# Patient Record
Sex: FEMALE | Race: ASIAN | NOT HISPANIC OR LATINO | URBAN - METROPOLITAN AREA
[De-identification: names, ages, dates, MRNs, and addresses within clinical notes are randomized per-mention and may not be internally consistent; named-entity substitution may affect disease eponyms.]

---

## 2018-12-14 ENCOUNTER — HOSPITAL ENCOUNTER (OUTPATIENT)
Dept: CT IMAGING | Facility: HOSPITAL | Age: 58
Discharge: HOME | End: 2018-12-14
Admitting: PHYSICIAN ASSISTANT

## 2018-12-14 ENCOUNTER — TRANSCRIBE ORDERS (OUTPATIENT)
Dept: ADMINISTRATIVE | Facility: HOSPITAL | Age: 58
End: 2018-12-14

## 2018-12-14 DIAGNOSIS — R19.01 RIGHT UPPER QUADRANT ABDOMINAL MASS: ICD-10-CM

## 2018-12-14 DIAGNOSIS — R19.01 RIGHT UPPER QUADRANT ABDOMINAL MASS: Primary | ICD-10-CM

## 2018-12-14 PROCEDURE — 74150 CT ABDOMEN W/O CONTRAST: CPT

## 2018-12-14 RX ADMIN — BARIUM SULFATE 450 ML: 21 SUSPENSION ORAL at 16:00

## 2025-07-01 ENCOUNTER — TELEPHONE (OUTPATIENT)
Dept: INTERNAL MEDICINE | Facility: CLINIC | Age: 65
End: 2025-07-01
Payer: MEDICARE

## 2025-07-01 NOTE — TELEPHONE ENCOUNTER
Caller: Ruthann Payan    Relationship to patient: Self    Best call back number: [phone]     Chief complaint:     Type of visit: NEW PATIENT    Requested date:      If rescheduling, when is the original appointment:      Additional notes:  PATIENT IS CALLING TO STATE THAT THEY [facility] AS DR LUNSFORD AND WOULD LIKE TO SEE IF HE WOULD SEE HER AS A NEW PATIENT.  SHE WAS RECOMMENDED BY A FRIEND THAT GOES TO THAT [facility].

## 2025-07-02 ENCOUNTER — TELEPHONE (OUTPATIENT)
Dept: INTERNAL MEDICINE | Facility: CLINIC | Age: 65
End: 2025-07-02
Payer: MEDICARE

## 2025-07-03 ENCOUNTER — OFFICE VISIT (OUTPATIENT)
Dept: INTERNAL MEDICINE | Facility: CLINIC | Age: 65
End: 2025-07-03
Payer: MEDICARE

## 2025-07-03 VITALS
TEMPERATURE: 97.1 F | RESPIRATION RATE: 18 BRPM | WEIGHT: 115.8 LBS | DIASTOLIC BLOOD PRESSURE: 60 MMHG | OXYGEN SATURATION: 95 % | SYSTOLIC BLOOD PRESSURE: 92 MMHG | HEART RATE: 64 BPM | HEIGHT: 66 IN | BODY MASS INDEX: 18.61 KG/M2

## 2025-07-03 DIAGNOSIS — R79.9 ABNORMAL BLOOD CHEMISTRY: ICD-10-CM

## 2025-07-03 DIAGNOSIS — R73.9 HYPERGLYCEMIA: ICD-10-CM

## 2025-07-03 DIAGNOSIS — T45.1X5S ADVERSE EFFECT OF CHEMOTHERAPY, SEQUELA: Primary | ICD-10-CM

## 2025-07-03 DIAGNOSIS — R06.02 SHORTNESS OF BREATH: ICD-10-CM

## 2025-07-03 DIAGNOSIS — D64.9 ANEMIA, UNSPECIFIED TYPE: ICD-10-CM

## 2025-07-03 DIAGNOSIS — Z13.220 SCREENING CHOLESTEROL LEVEL: ICD-10-CM

## 2025-07-03 DIAGNOSIS — E55.9 VITAMIN D DEFICIENCY, UNSPECIFIED: ICD-10-CM

## 2025-07-03 RX ORDER — URSODIOL 250 MG/1
250 TABLET, FILM COATED ORAL 2 TIMES DAILY
Qty: 180 TABLET | Refills: 1 | Status: SHIPPED | OUTPATIENT
Start: 2025-07-03

## 2025-07-03 RX ORDER — URSODIOL 250 MG/1
250 TABLET, FILM COATED ORAL 2 TIMES DAILY
COMMUNITY
End: 2025-07-03 | Stop reason: SDUPTHER

## 2025-07-03 RX ORDER — LETROZOLE 2.5 MG/1
2.5 TABLET, FILM COATED ORAL DAILY
COMMUNITY

## 2025-07-03 NOTE — PROGRESS NOTES
Subjective   Ruthann Payan is a 64 y.o. female.     History of Present Illness  Presents to establish care  Breast cancer. Bilateral mastectomy. On letrozole.   States letrozole caused some issues with her liver, and her doctor in [facility] started her on actifall and it has helped her greatly. She takes takes 250 mg and states it works very well for her and would like refill  Shortness of breath. Had pft in [place] and was started on fluticasone-vilanterol. States she isn't really using it. She states she has been doing lung exercises and singing in choir at her [employer] to train her lungs and she feels that this is working for her. She did have lobectomy of right lung due to lung adenocarcinoma in the past.   She also has had rou-en-y due to gastric cancer.   States she gets her cancer care and scans in [facility] when she goes.   She noticed a drop in hemoglobin due to dizziness, and she followed with hematology at . Has not had her labs drawn from that visit yet, but will have them done today.       The following portions of the patient's history were reviewed and updated as appropriate: allergies, current medications, past family history, past medical history, past social history, past surgical history, and problem list.    Review of Systems   All other systems reviewed and are negative.      Objective   Physical Exam  Vitals and nursing note reviewed.   Constitutional:       Appearance: Normal appearance.   HENT:      Head: Normocephalic and atraumatic.      Right Ear: External ear normal.      Left Ear: External ear normal.      Nose: Nose normal.      Mouth/Throat:      Mouth: Mucous membranes are moist.      Pharynx: Oropharynx is clear. No oropharyngeal exudate or posterior oropharyngeal erythema.   Eyes:      Extraocular Movements: Extraocular movements intact.      Conjunctiva/sclera: Conjunctivae normal.      Pupils: Pupils are equal, round, and reactive to light.   Cardiovascular:      Rate and Rhythm: Regular  rhythm.      Pulses: Normal pulses.      Heart sounds: Normal heart sounds.   Pulmonary:      Effort: Pulmonary effort is normal.   Abdominal:      General: Abdomen is flat. Bowel sounds are normal.      Palpations: Abdomen is soft.   Musculoskeletal:         General: Normal range of motion.      Cervical back: Normal range of motion.   Skin:     General: Skin is warm.      Capillary Refill: Capillary refill takes less than 2 seconds.   Neurological:      General: No focal deficit present.      Mental Status: She is alert and oriented to person, place, and time. Mental status is at baseline.   Psychiatric:         Mood and Affect: Mood normal.         Behavior: Behavior normal.         Thought Content: Thought content normal.         Judgment: Judgment normal.         Assessment & Plan   Diagnoses and all orders for this visit:    1. Adverse effect of chemotherapy, sequela (Primary)  -     ursodiol (ACTIGALL) 250 MG tablet; Take 1 tablet by mouth 2 (Two) Times a Day.  Dispense: 180 tablet; Refill: 1  -     CBC (No Diff)  -     Iron Profile w/o Ferritin  -     Comprehensive Metabolic Panel  -     Hemoglobin A1c  -     Lipid Panel  -     TSH  -     Vitamin D,25-Hydroxy  -     Vitamin B12    2. Shortness of breath  -     CBC (No Diff)  -     Iron Profile w/o Ferritin  -     Comprehensive Metabolic Panel  -     Hemoglobin A1c  -     Lipid Panel  -     TSH  -     Vitamin D,25-Hydroxy  -     Vitamin B12    3. Anemia, unspecified type  -     CBC (No Diff)  -     Iron Profile w/o Ferritin  -     Comprehensive Metabolic Panel  -     Hemoglobin A1c  -     Lipid Panel  -     TSH  -     Vitamin D,25-Hydroxy  -     Vitamin B12    4. Abnormal blood chemistry  -     CBC (No Diff)  -     Iron Profile w/o Ferritin  -     Comprehensive Metabolic Panel  -     Hemoglobin A1c  -     Lipid Panel  -     TSH  -     Vitamin D,25-Hydroxy  -     Vitamin B12    5. Hyperglycemia  -     CBC (No Diff)  -     Iron Profile w/o Ferritin  -      Comprehensive Metabolic Panel  -     Hemoglobin A1c  -     Lipid Panel  -     TSH  -     Vitamin D,25-Hydroxy  -     Vitamin B12    6. Screening cholesterol level  -     CBC (No Diff)  -     Iron Profile w/o Ferritin  -     Comprehensive Metabolic Panel  -     Hemoglobin A1c  -     Lipid Panel  -     TSH  -     Vitamin D,25-Hydroxy  -     Vitamin B12    7. Vitamin D deficiency, unspecified  -     Vitamin D,25-Hydroxy         [place] [facility] forms filled out and faxed today

## 2025-07-04 LAB
25(OH)D3+25(OH)D2 SERPL-MCNC: 42.6 NG/ML (ref 30–100)
ALBUMIN SERPL-MCNC: 4.7 G/DL (ref 3.5–5.2)
ALBUMIN/GLOB SERPL: 2 G/DL
ALP SERPL-CCNC: 89 U/L (ref 39–117)
ALT SERPL-CCNC: 41 U/L (ref 1–33)
AST SERPL-CCNC: 37 U/L (ref 1–32)
BILIRUB SERPL-MCNC: 0.7 MG/DL (ref 0–1.2)
BUN SERPL-MCNC: 14 MG/DL (ref 8–23)
BUN/CREAT SERPL: 20.3 (ref 7–25)
CALCIUM SERPL-MCNC: 9.9 MG/DL (ref 8.6–10.5)
CHLORIDE SERPL-SCNC: 101 MMOL/L (ref 98–107)
CHOLEST SERPL-MCNC: 167 MG/DL (ref 0–200)
CO2 SERPL-SCNC: 27.6 MMOL/L (ref 22–29)
CREAT SERPL-MCNC: 0.69 MG/DL (ref 0.57–1)
EGFRCR SERPLBLD CKD-EPI 2021: 95.9 ML/MIN/1.73
ERYTHROCYTE [DISTWIDTH] IN BLOOD BY AUTOMATED COUNT: 12.4 % (ref 12.3–15.4)
GLOBULIN SER CALC-MCNC: 2.4 GM/DL
GLUCOSE SERPL-MCNC: 94 MG/DL (ref 65–99)
HBA1C MFR BLD: 5.3 % (ref 4.8–5.6)
HCT VFR BLD AUTO: 37.5 % (ref 34–46.6)
HDLC SERPL-MCNC: 83 MG/DL (ref 40–60)
HGB BLD-MCNC: 12.2 G/DL (ref 12–15.9)
IRON SATN MFR SERPL: 23 % (ref 20–50)
IRON SERPL-MCNC: 123 MCG/DL (ref 37–145)
LDLC SERPL CALC-MCNC: 61 MG/DL (ref 0–100)
MCH RBC QN AUTO: 30 PG (ref 26.6–33)
MCHC RBC AUTO-ENTMCNC: 32.5 G/DL (ref 31.5–35.7)
MCV RBC AUTO: 92.4 FL (ref 79–97)
PLATELET # BLD AUTO: 231 10*3/MM3 (ref 140–450)
POTASSIUM SERPL-SCNC: 4.3 MMOL/L (ref 3.5–5.2)
PROT SERPL-MCNC: 7.1 G/DL (ref 6–8.5)
RBC # BLD AUTO: 4.06 10*6/MM3 (ref 3.77–5.28)
SODIUM SERPL-SCNC: 139 MMOL/L (ref 136–145)
TIBC SERPL-MCNC: 545 MCG/DL
TRIGL SERPL-MCNC: 134 MG/DL (ref 0–150)
TSH SERPL DL<=0.005 MIU/L-ACNC: 1.93 UIU/ML (ref 0.27–4.2)
UIBC SERPL-MCNC: 422 MCG/DL (ref 112–346)
VIT B12 SERPL-MCNC: 1088 PG/ML (ref 211–946)
VLDLC SERPL CALC-MCNC: 23 MG/DL (ref 5–40)
WBC # BLD AUTO: 6.27 10*3/MM3 (ref 3.4–10.8)

## 2025-07-07 NOTE — TELEPHONE ENCOUNTER
Called and spoke to pt.  She recently was seen 7/3 by Dr. Adkins but wants to continue her care with Dr. Raygoza.  She states she does not need an appt scheduled at this time.  I did change her PCP in her chart to Dr. Raygoza so next time she calls she can schedule.

## 2025-07-09 ENCOUNTER — TELEPHONE (OUTPATIENT)
Dept: INTERNAL MEDICINE | Facility: CLINIC | Age: 65
End: 2025-07-09

## 2025-07-16 ENCOUNTER — OFFICE VISIT (OUTPATIENT)
Dept: INTERNAL MEDICINE | Facility: CLINIC | Age: 65
End: 2025-07-16
Payer: MEDICARE

## 2025-07-16 VITALS
WEIGHT: 116 LBS | OXYGEN SATURATION: 97 % | HEIGHT: 66 IN | DIASTOLIC BLOOD PRESSURE: 66 MMHG | RESPIRATION RATE: 16 BRPM | SYSTOLIC BLOOD PRESSURE: 102 MMHG | HEART RATE: 73 BPM | TEMPERATURE: 97.8 F | BODY MASS INDEX: 18.64 KG/M2

## 2025-07-16 DIAGNOSIS — R60.0 LEG EDEMA, RIGHT: ICD-10-CM

## 2025-07-16 DIAGNOSIS — R42 DIZZINESS: Primary | ICD-10-CM

## 2025-07-16 DIAGNOSIS — Z11.59 ENCOUNTER FOR SCREENING FOR OTHER VIRAL DISEASES: ICD-10-CM

## 2025-07-16 DIAGNOSIS — R74.8 ELEVATED LIVER ENZYMES: ICD-10-CM

## 2025-07-16 DIAGNOSIS — C34.11 MALIGNANT NEOPLASM OF UPPER LOBE OF RIGHT LUNG: ICD-10-CM

## 2025-07-16 DIAGNOSIS — Z78.0 POST-MENOPAUSAL: ICD-10-CM

## 2025-07-16 DIAGNOSIS — J45.40 MODERATE PERSISTENT ASTHMA, UNSPECIFIED WHETHER COMPLICATED: ICD-10-CM

## 2025-07-16 PROCEDURE — G2211 COMPLEX E/M VISIT ADD ON: HCPCS | Performed by: INTERNAL MEDICINE

## 2025-07-16 PROCEDURE — 1126F AMNT PAIN NOTED NONE PRSNT: CPT | Performed by: INTERNAL MEDICINE

## 2025-07-16 PROCEDURE — 99214 OFFICE O/P EST MOD 30 MIN: CPT | Performed by: INTERNAL MEDICINE

## 2025-07-16 NOTE — PROGRESS NOTES
"Subjective     Patient ID: Ruthann Payan is a 64 y.o. female. Patient is here for management of multiple medical problems.     Chief Complaint   Patient presents with    Dizziness     She's been having dizziness for about a month.      History of Present Illness     Dizziness. X 1 months.   Postion changes. With standing and exercise and then turn getts dizzy.     The following portions of the patient's history were reviewed and updated as appropriate: allergies, current medications, past family history, past medical history, past social history, past surgical history and problem list.    Review of Systems    Current Outpatient Medications:     FLUTICASONE FUROATE-VILANTEROL IN, Inhale. 92/22 mcg, Disp: , Rfl:     letrozole (FEMARA) 2.5 MG tablet, Take 1 tablet by mouth Daily., Disp: , Rfl:     ursodiol (ACTIGALL) 250 MG tablet, Take 1 tablet by mouth 2 (Two) Times a Day., Disp: 180 tablet, Rfl: 1    Objective      Blood pressure 102/66, pulse 73, temperature 97.8 °F (36.6 °C), temperature source Temporal, resp. rate 16, height 167.6 cm (65.98\"), weight 52.6 kg (116 lb), SpO2 97%.    BMI is within normal parameters. No other follow-up for BMI required.       Physical Exam     General Appearance:    Alert, cooperative, no distress, appears stated age   Head:    Normocephalic, without obvious abnormality, atraumatic   Eyes:    PERRL, conjunctiva/corneas clear, EOM's intact   Ears:    Canal closing down on right side.  Normal TM's and external ear canals, both ears   Nose:   Nares normal, septum midline, mucosa normal, no drainage   or sinus tenderness   Throat:   Lips, mucosa, and tongue normal; teeth and gums normal   Neck:   Supple, symmetrical, trachea midline, no adenopathy;        thyroid:  No enlargement/tenderness/nodules; no carotid    bruit or JVD   Back:     Symmetric, no curvature, ROM normal, no CVA tenderness   Lungs:     Wheezing in righ hilda lung. Fibosis heard.  to auscultation bilaterally, respirations " unlabored   Chest wall:    No tenderness or deformity   Heart:    Regular rate and rhythm, S1 and S2 normal, no murmur,        rub or gallop   Abdomen:     Soft, non-tender, bowel sounds active all four quadrants,     no masses, no organomegaly   Extremities:   Extremities normal, atraumatic, no cyanosis or edema   Pulses:   2+ and symmetric all extremities   Skin:   Skin color, texture, turgor normal, no rashes or lesions   Lymph nodes:   Cervical, supraclavicular, and axillary nodes normal   Neurologic:   CNII-XII intact. Normal strength, sensation and reflexes       throughout      Results for orders placed or performed in visit on 07/03/25   CBC (No Diff)    Collection Time: 07/03/25  2:59 PM    Specimen: Blood   Result Value Ref Range    WBC 6.27 3.40 - 10.80 10*3/mm3    RBC 4.06 3.77 - 5.28 10*6/mm3    Hemoglobin 12.2 12.0 - 15.9 g/dL    Hematocrit 37.5 34.0 - 46.6 %    MCV 92.4 79.0 - 97.0 fL    MCH 30.0 26.6 - 33.0 pg    MCHC 32.5 31.5 - 35.7 g/dL    RDW 12.4 12.3 - 15.4 %    Platelets 231 140 - 450 10*3/mm3   Iron Profile w/o Ferritin    Collection Time: 07/03/25  2:59 PM    Specimen: Blood   Result Value Ref Range    TIBC 545 mcg/dL    UIBC 422 (H) 112 - 346 mcg/dL    Iron 123 37 - 145 mcg/dL    Iron Saturation 23 20 - 50 %   Comprehensive Metabolic Panel    Collection Time: 07/03/25  2:59 PM    Specimen: Blood   Result Value Ref Range    Glucose 94 65 - 99 mg/dL    BUN 14.0 8.0 - 23.0 mg/dL    Creatinine 0.69 0.57 - 1.00 mg/dL    EGFR Result 95.9 >60.0 mL/min/1.73    BUN/Creatinine Ratio 20.3 7.0 - 25.0    Sodium 139 136 - 145 mmol/L    Potassium 4.3 3.5 - 5.2 mmol/L    Chloride 101 98 - 107 mmol/L    Total CO2 27.6 22.0 - 29.0 mmol/L    Calcium 9.9 8.6 - 10.5 mg/dL    Total Protein 7.1 6.0 - 8.5 g/dL    Albumin 4.7 3.5 - 5.2 g/dL    Globulin 2.4 gm/dL    A/G Ratio 2.0 g/dL    Total Bilirubin 0.7 0.0 - 1.2 mg/dL    Alkaline Phosphatase 89 39 - 117 U/L    AST (SGOT) 37 (H) 1 - 32 U/L    ALT (SGPT) 41 (H) 1  - 33 U/L   Hemoglobin A1c    Collection Time: 07/03/25  2:59 PM    Specimen: Blood   Result Value Ref Range    Hemoglobin A1C 5.30 4.80 - 5.60 %   Lipid Panel    Collection Time: 07/03/25  2:59 PM    Specimen: Blood   Result Value Ref Range    Total Cholesterol 167 0 - 200 mg/dL    Triglycerides 134 0 - 150 mg/dL    HDL Cholesterol 83 (H) 40 - 60 mg/dL    VLDL Cholesterol Johnathan 23 5 - 40 mg/dL    LDL Chol Calc (NIH) 61 0 - 100 mg/dL   TSH    Collection Time: 07/03/25  2:59 PM    Specimen: Blood   Result Value Ref Range    TSH 1.930 0.270 - 4.200 uIU/mL   Vitamin D,25-Hydroxy    Collection Time: 07/03/25  2:59 PM    Specimen: Blood   Result Value Ref Range    25 Hydroxy, Vitamin D 42.6 30.0 - 100.0 ng/ml   Vitamin B12    Collection Time: 07/03/25  2:59 PM    Specimen: Blood   Result Value Ref Range    Vitamin B-12 1,088 (H) 211 - 946 pg/mL         Assessment & Plan   Dizziness seems to be orthstatic. Not drinking as much water.      Hearing loss on right side.   May need to consider MRI brain.  Pt  with hx of 4 types of cancer.    Increased lft.   She was told by oncology this is a se from chemo.    [place] [facility].    Pt will likely have + hep ab's given her child novak hx.                 Diagnoses and all orders for this visit:    1. Dizziness (Primary)  -     Hepatitis B surface antigen  -     Hepatitis B surface antibody  -     Hepatitis B core antibody, total  -     Hepatitis A antibody, total  -     Hepatitis C antibody    2. Elevated liver enzymes  -     Hepatitis B surface antigen  -     Hepatitis B surface antibody  -     Hepatitis B core antibody, total  -     Hepatitis A antibody, total  -     Hepatitis C antibody    3. Encounter for screening for other viral diseases  -     Hepatitis B surface antigen  -     Hepatitis B surface antibody  -     Hepatitis B core antibody, total  -     RPR Qualitative with Reflex to Quant    4. Post-menopausal  -     DEXA Bone Density  Axial    5. Malignant neoplasm of upper lobe of right lung  -     Overnight Sleep Oximetry Study    6. Leg edema, right  -     Overnight Sleep Oximetry Study    7. Moderate persistent asthma, unspecified whether complicated      Return in about 6 weeks (around 8/27/2025) for Annual physical, Recheck, Next scheduled follow up.          There are no Patient Instructions on file for this visit.     David Raygoza MD    Assessment & Plan

## 2025-07-17 LAB
HAV AB SER QL IA: POSITIVE
HBV CORE AB SERPL QL IA: NEGATIVE
HBV SURFACE AB SER QL: REACTIVE
HBV SURFACE AG SERPL QL IA: NEGATIVE
HCV IGG SERPL QL IA: NON REACTIVE
RPR SER QL: NON REACTIVE

## 2025-08-08 ENCOUNTER — TELEPHONE (OUTPATIENT)
Dept: INTERNAL MEDICINE | Facility: CLINIC | Age: 65
End: 2025-08-08
Payer: MEDICARE

## 2025-08-25 ENCOUNTER — OFFICE VISIT (OUTPATIENT)
Dept: INTERNAL MEDICINE | Facility: CLINIC | Age: 65
End: 2025-08-25
Payer: MEDICARE

## 2025-08-25 VITALS
DIASTOLIC BLOOD PRESSURE: 62 MMHG | TEMPERATURE: 97.8 F | OXYGEN SATURATION: 100 % | HEIGHT: 66 IN | SYSTOLIC BLOOD PRESSURE: 100 MMHG | BODY MASS INDEX: 18.96 KG/M2 | WEIGHT: 118 LBS | HEART RATE: 64 BPM | RESPIRATION RATE: 16 BRPM

## 2025-08-25 DIAGNOSIS — I95.9 HYPOTENSION, UNSPECIFIED HYPOTENSION TYPE: ICD-10-CM

## 2025-08-25 DIAGNOSIS — R74.8 ELEVATED LIVER ENZYMES: Primary | ICD-10-CM

## 2025-08-25 DIAGNOSIS — Z00.00 ROUTINE GENERAL MEDICAL EXAMINATION AT A HEALTH CARE FACILITY: ICD-10-CM

## 2025-08-25 PROCEDURE — 99397 PER PM REEVAL EST PAT 65+ YR: CPT | Performed by: INTERNAL MEDICINE

## 2025-08-25 PROCEDURE — 99214 OFFICE O/P EST MOD 30 MIN: CPT | Performed by: INTERNAL MEDICINE

## 2025-08-25 PROCEDURE — 1170F FXNL STATUS ASSESSED: CPT | Performed by: INTERNAL MEDICINE

## 2025-08-25 PROCEDURE — 1126F AMNT PAIN NOTED NONE PRSNT: CPT | Performed by: INTERNAL MEDICINE

## 2025-08-25 PROCEDURE — G2211 COMPLEX E/M VISIT ADD ON: HCPCS | Performed by: INTERNAL MEDICINE

## 2025-08-25 PROCEDURE — G0439 PPPS, SUBSEQ VISIT: HCPCS | Performed by: INTERNAL MEDICINE
